# Patient Record
Sex: MALE | Race: WHITE | NOT HISPANIC OR LATINO | Employment: OTHER | URBAN - METROPOLITAN AREA
[De-identification: names, ages, dates, MRNs, and addresses within clinical notes are randomized per-mention and may not be internally consistent; named-entity substitution may affect disease eponyms.]

---

## 2017-01-05 DIAGNOSIS — Z12.5 SCREENING PSA (PROSTATE SPECIFIC ANTIGEN): ICD-10-CM

## 2017-01-05 DIAGNOSIS — I10 ESSENTIAL HYPERTENSION: Primary | ICD-10-CM

## 2017-01-05 DIAGNOSIS — Z87.39 HISTORY OF GOUT: ICD-10-CM

## 2017-01-05 DIAGNOSIS — E78.5 HYPERLIPIDEMIA, UNSPECIFIED HYPERLIPIDEMIA TYPE: ICD-10-CM

## 2017-01-10 LAB
ALBUMIN SERPL-MCNC: 4.7 G/DL (ref 3.5–5.2)
ALBUMIN/GLOB SERPL: 2.1 G/DL
ALP SERPL-CCNC: 85 U/L (ref 39–117)
ALT SERPL-CCNC: 34 U/L (ref 1–41)
AST SERPL-CCNC: 16 U/L (ref 1–40)
BILIRUB SERPL-MCNC: 0.7 MG/DL (ref 0.1–1.2)
BUN SERPL-MCNC: 24 MG/DL (ref 8–23)
BUN/CREAT SERPL: 26.1 (ref 7–25)
CALCIUM SERPL-MCNC: 9.6 MG/DL (ref 8.6–10.5)
CHLORIDE SERPL-SCNC: 103 MMOL/L (ref 98–107)
CHOLEST SERPL-MCNC: 185 MG/DL (ref 0–200)
CO2 SERPL-SCNC: 27.4 MMOL/L (ref 22–29)
CREAT SERPL-MCNC: 0.92 MG/DL (ref 0.76–1.27)
GLOBULIN SER CALC-MCNC: 2.2 GM/DL
GLUCOSE SERPL-MCNC: 114 MG/DL (ref 65–99)
HDLC SERPL-MCNC: 49 MG/DL (ref 40–60)
LDLC SERPL CALC-MCNC: 116 MG/DL (ref 0–100)
LDLC/HDLC SERPL: 2.36 {RATIO}
POTASSIUM SERPL-SCNC: 4.4 MMOL/L (ref 3.5–5.2)
PROT SERPL-MCNC: 6.9 G/DL (ref 6–8.5)
PSA SERPL-MCNC: 1.68 NG/ML (ref 0–4)
SODIUM SERPL-SCNC: 142 MMOL/L (ref 136–145)
TRIGL SERPL-MCNC: 102 MG/DL (ref 0–150)
URATE SERPL-MCNC: 6.8 MG/DL (ref 3.4–7)
VLDLC SERPL CALC-MCNC: 20.4 MG/DL (ref 5–40)

## 2017-01-16 ENCOUNTER — OFFICE VISIT (OUTPATIENT)
Dept: FAMILY MEDICINE CLINIC | Facility: CLINIC | Age: 67
End: 2017-01-16

## 2017-01-16 VITALS
SYSTOLIC BLOOD PRESSURE: 154 MMHG | TEMPERATURE: 97.6 F | BODY MASS INDEX: 31.7 KG/M2 | DIASTOLIC BLOOD PRESSURE: 80 MMHG | HEIGHT: 69 IN | RESPIRATION RATE: 16 BRPM | WEIGHT: 214 LBS

## 2017-01-16 DIAGNOSIS — R41.840 ATTENTION OR CONCENTRATION DEFICIT: ICD-10-CM

## 2017-01-16 DIAGNOSIS — I10 ESSENTIAL HYPERTENSION: ICD-10-CM

## 2017-01-16 DIAGNOSIS — Z23 NEED FOR VACCINATION: Primary | ICD-10-CM

## 2017-01-16 DIAGNOSIS — E78.00 PURE HYPERCHOLESTEROLEMIA: ICD-10-CM

## 2017-01-16 DIAGNOSIS — Z87.39 HISTORY OF GOUT: ICD-10-CM

## 2017-01-16 PROCEDURE — 90670 PCV13 VACCINE IM: CPT

## 2017-01-16 PROCEDURE — 99213 OFFICE O/P EST LOW 20 MIN: CPT

## 2017-01-16 PROCEDURE — G0009 ADMIN PNEUMOCOCCAL VACCINE: HCPCS

## 2017-01-16 RX ORDER — DIAZEPAM 5 MG/1
5 TABLET ORAL 2 TIMES DAILY PRN
Qty: 60 TABLET | Refills: 0 | Status: SHIPPED | OUTPATIENT
Start: 2017-01-16 | End: 2021-05-14

## 2017-01-16 RX ORDER — LOSARTAN POTASSIUM 100 MG/1
100 TABLET ORAL DAILY
Qty: 30 TABLET | Refills: 5 | Status: SHIPPED | OUTPATIENT
Start: 2017-01-16 | End: 2017-01-20

## 2017-01-16 RX ORDER — METHYLPHENIDATE HYDROCHLORIDE 20 MG/1
20 TABLET ORAL 3 TIMES DAILY
Qty: 90 TABLET | Refills: 0 | Status: SHIPPED | OUTPATIENT
Start: 2017-01-16 | End: 2017-08-28 | Stop reason: SDUPTHER

## 2017-01-16 NOTE — MR AVS SNAPSHOT
Carlos Webb   1/16/2017 9:00 AM   Office Visit    Dept Phone:  [phone]   Encounter #:  [id number]    Provider:  Pramod Burger MD   Department:  [facility]                Your Full Care Plan              Today's Medication Changes          These changes are accurate as of: 1/16/17  9:22 AM.  If you have any questions, ask your nurse or doctor.               New Medication(s)Ordered:     losartan 100 MG tablet   Commonly known as:  COZAAR   Take 1 tablet by mouth Daily.   Started by:  Alexa Baires MA         Medication(s)that have changed:     diazePAM 5 MG tablet   Commonly known as:  VALIUM   Take 1 tablet by mouth 2 (Two) Times a Day As Needed for anxiety.   What changed:  reasons to take this   Changed by:  Alexa Baires MA            Where to Get Your Medications      These medications were sent to [facility]/pharmacy #[id number] [place], [place] - [address] [address] AT [facility] [phone] [facility] [phone]   [address], [facility] [address]     Phone:  [phone]     losartan 100 MG tablet         You can get these medications from any pharmacy     Bring a paper prescription for each of these medications     diazePAM 5 MG tablet    methylphenidate 20 MG tablet                  Your Updated Medication List          This list is accurate as of: 1/16/17  9:22 AM.  Always use your most recent med list.                allopurinol 300 MG tablet   Commonly known as:  ZYLOPRIM   TAKE 1 TABLET BY MOUTH EVERY DAY       diazePAM 5 MG tablet   Commonly known as:  VALIUM   Take 1 tablet by mouth 2 (Two) Times a Day As Needed for anxiety.       losartan 100 MG tablet   Commonly known as:  COZAAR   Take 1 tablet by mouth Daily.       methylphenidate 20 MG tablet   Commonly known as:  RITALIN   Take 1 tablet by mouth 3 (Three) Times a Day.       verapamil  MG CR tablet   Commonly known as:  CALAN-SR   TAKE 1  "TABLET BY MOUTH EVERY DAY       VOLTAREN 1 % gel gel   Generic drug:  diclofenac               We Performed the Following     Pneumococcal Conjugate Vaccine 13-Valent All       You Were Diagnosed With        Codes Comments    Need for vaccination    -  Primary ICD-10-CM: Z23  ICD-9-CM: V05.9       Instructions     None    Patient Instructions History      Upcoming Appointments     Visit Type Date Time Department    FOLLOW UP 1/16/2017  9:00 AM [facility]      [employer] Signup     Our records indicate that you have an active [employer] account.    You can view your After Visit Summary by going to G3 and logging in with your [employer] username and password.  If you don't have a [employer] username and password but a parent or guardian has access to your record, the parent or guardian should login with their own [employer] username and password and access your record to view the After Visit Summary.    If you have questions, you can email [email or web address] or call [phone] to talk to our [employer] staff.  Remember, [employer] is NOT to be used for urgent needs.  For medical emergencies, dial 911.               Other Info from Your Visit           Allergies     Azithromycin        Reason for Visit     Hyperlipidemia     Hypertension     Gout           Vital Signs     Blood Pressure Temperature Respirations Height Weight Body Mass Index    154/80 97.6 °F (36.4 °C) (Oral) 16 69\" (175.3 cm) 214 lb (97.1 kg) 31.6 kg/m2    Smoking Status                   Former Smoker           Problems and Diagnoses Noted     Need for vaccination    -  Primary      Immunizations Administered     Name Date    Pneumococcal Conjugate 13-Valent         "

## 2017-01-16 NOTE — PROGRESS NOTES
Subjective   Carlos Webb is a 66 y.o. male. Patient is here today for   Chief Complaint   Patient presents with   • Hyperlipidemia   • Hypertension   • Gout          Vitals:    01/16/17 0846   BP: 154/80   Resp: 16   Temp: 97.6 °F (36.4 °C)     The following portions of the patient's history were reviewed and updated as appropriate: allergies, current medications, past family history, past medical history, past social history, past surgical history and problem list.    Past Medical History   Diagnosis Date   • ADHD (attention deficit hyperactivity disorder)    • Anxiety    • Gout    • Hyperlipidemia    • Hypertension    • Splenomegaly       Allergies   Allergen Reactions   • Azithromycin       Social History     Social History   • Marital status:      Spouse name: N/A   • Number of children: N/A   • Years of education: N/A     Occupational History   • Not on file.     Social History Main Topics   • Smoking status: Former Smoker   • Smokeless tobacco: Not on file   • Alcohol use Yes   • Drug use: Not on file   • Sexual activity: Not on file     Other Topics Concern   • Not on file     Social History Narrative        Current Outpatient Prescriptions:   •  allopurinol (ZYLOPRIM) 300 MG tablet, TAKE 1 TABLET BY MOUTH EVERY DAY, Disp: 90 tablet, Rfl: 1  •  verapamil SR (CALAN-SR) 240 MG CR tablet, TAKE 1 TABLET BY MOUTH EVERY DAY, Disp: 90 tablet, Rfl: 1  •  VOLTAREN 1 % gel gel, , Disp: , Rfl:   •  diazePAM (VALIUM) 5 MG tablet, Take 1 tablet by mouth 2 (Two) Times a Day As Needed for anxiety., Disp: 60 tablet, Rfl: 0  •  methylphenidate (RITALIN) 20 MG tablet, Take 1 tablet by mouth 3 (Three) Times a Day., Disp: 90 tablet, Rfl: 0     Objective     History of Present Illness   The patient is here today for follow-up on essential hypertension, mild hyperlipidemia, history of gout, attention deficit disorder and mild chronic anxiety    Review of Systems   Constitutional:        Mild fatigue   HENT: Negative.     Respiratory: Negative for cough, shortness of breath and wheezing.    Cardiovascular: Negative for chest pain, palpitations and leg swelling.   Gastrointestinal: Negative for abdominal pain, blood in stool, constipation and diarrhea.   Genitourinary: Negative.    Musculoskeletal:        Mild aches and pains only.  No recent flareups of gout   Neurological: Negative.    Hematological: Negative.    Psychiatric/Behavioral:        The patient does have a history of attention deficit disorder and is on Ritalin for this.  Patient states that he is doing very well with this medication.  The patient does have tremendous amount of stress at work and does have a certain amount of anxiety related to this.       Physical Exam   Constitutional: He is oriented to person, place, and time. He appears well-developed and well-nourished.   Overweight   Neck:   Carotid pulses normal   Cardiovascular: Normal rate, regular rhythm and normal heart sounds.    Pulmonary/Chest: Effort normal and breath sounds normal. No respiratory distress. He has no wheezes. He has no rales.   Abdominal: Soft. Bowel sounds are normal.   Musculoskeletal:   Mild osteoarthritic changes in joints   Neurological: He is alert and oriented to person, place, and time.   Skin: Skin is warm and dry.   Psychiatric: He has a normal mood and affect.   Nursing note and vitals reviewed.       ASSESSMENT  #1 essential hypertension       #2 mild hyperglycemia       #3 hyperlipidemia-mild       #4 history of gout and hyperuricemia        #5 attention deficit disorder       #6 mild chronic anxiety    DISCUSSION/SUMMARY   The patient's blood pressure was initially somewhat elevated but upon recheck it was only 120/78.  The patient tells me that there are times after getting home from work his blood pressure is very high, in the 180/100 range .  The patient tells me that he has not been on a very good diet at all and is getting very little exercise over the last 6 months.   He is doing well on his Ritalin for his attention deficit disorder.  The patient tells me that he is under a moderate amount of stress, so this is a problem.  CMP was normal except for elevated fasting blood sugar of 114.  Uric acid level is 6.8 but the patient has had no flareups of gout over the last 6 months.  PSA remains normal at 1.68.  Total cholesterol is 185, triglycerides 102, HDL cholesterol 49, and LDL cholesterol is mildly elevated at 116.  Patient is on no medications for his mild hyperlipidemia.  We discussed a low sugar, low starch and low saturated fat diet.  I am concerned about his blood pressure going up as high as it does on a frequent basis.  The cause of that I am starting the patient on losartan 100 mg tablets one daily in addition to his verapamil.  He will closely monitor his blood pressures and let me know if there are any problems with it being too high or too low.  The patient was given a prescription for his Ritalin for the attention deficit disorder and diazepam which she uses for his chronic stress syndrome.  The patient also received a Prevnar 13 vaccine today and will receive a Pneumovax vaccine approximately one year from now.  The patient has had a Zostavax vaccine in the past.    PLAN  Recheck in 6 months with fasting CMP, lipid panel and uric acid level.  No Follow-up on file.

## 2017-01-20 ENCOUNTER — TELEPHONE (OUTPATIENT)
Dept: FAMILY MEDICINE CLINIC | Facility: CLINIC | Age: 67
End: 2017-01-20

## 2017-01-20 RX ORDER — AMLODIPINE BESYLATE 5 MG/1
5 TABLET ORAL DAILY
Qty: 30 TABLET | Refills: 5 | Status: SHIPPED | OUTPATIENT
Start: 2017-01-20 | End: 2017-08-28

## 2017-01-20 NOTE — TELEPHONE ENCOUNTER
Pt aware    ----- Message from Pramod Burger MD sent at 1/20/2017 11:53 AM EST -----   If the patient feels that the losartan is causing muscle weakness stop it.  Send in prescription for amlodipine 5 mg tablets, one daily #30 with 5 refills.  Continue to monitor home blood pressures and let us know if the blood pressure is not improving by midweek  ----- Message -----     From: Alexa Baires MA     Sent: 1/20/2017  10:39 AM       To: Pramod Burger MD        ----- Message -----     From: Jessa Tabor     Sent: 1/20/2017   9:39 AM       To: Alexa Baires MA    LOSARTIN NOT WORKING CAUSING MUSCLE WEAKNESS ABD ISNT LOWERING BP     PLEASE CALL PT TO ADVISE WHAT NEEDS TO BE DONE     [phone]

## 2017-05-15 RX ORDER — ALLOPURINOL 300 MG/1
TABLET ORAL
Qty: 90 TABLET | Refills: 1 | Status: SHIPPED | OUTPATIENT
Start: 2017-05-15 | End: 2017-11-14 | Stop reason: SDUPTHER

## 2017-05-15 RX ORDER — VERAPAMIL HYDROCHLORIDE 240 MG/1
TABLET, FILM COATED, EXTENDED RELEASE ORAL
Qty: 90 TABLET | Refills: 1 | Status: SHIPPED | OUTPATIENT
Start: 2017-05-15 | End: 2017-11-14 | Stop reason: SDUPTHER

## 2017-07-14 DIAGNOSIS — I10 ESSENTIAL HYPERTENSION: Primary | ICD-10-CM

## 2017-07-14 DIAGNOSIS — M10.9 GOUT, UNSPECIFIED CAUSE, UNSPECIFIED CHRONICITY, UNSPECIFIED SITE: ICD-10-CM

## 2017-07-14 DIAGNOSIS — E78.5 HYPERLIPIDEMIA, UNSPECIFIED HYPERLIPIDEMIA TYPE: ICD-10-CM

## 2017-07-17 LAB
ALBUMIN SERPL-MCNC: 4.7 G/DL (ref 3.5–5.2)
ALBUMIN/GLOB SERPL: 1.8 G/DL
ALP SERPL-CCNC: 96 U/L (ref 39–117)
ALT SERPL-CCNC: 35 U/L (ref 1–41)
AST SERPL-CCNC: 26 U/L (ref 1–40)
BILIRUB SERPL-MCNC: 0.6 MG/DL (ref 0.1–1.2)
BUN SERPL-MCNC: 18 MG/DL (ref 8–23)
BUN/CREAT SERPL: 20.7 (ref 7–25)
CALCIUM SERPL-MCNC: 10 MG/DL (ref 8.6–10.5)
CHLORIDE SERPL-SCNC: 105 MMOL/L (ref 98–107)
CHOLEST SERPL-MCNC: 188 MG/DL (ref 0–200)
CO2 SERPL-SCNC: 24.6 MMOL/L (ref 22–29)
CREAT SERPL-MCNC: 0.87 MG/DL (ref 0.76–1.27)
GLOBULIN SER CALC-MCNC: 2.6 GM/DL
GLUCOSE SERPL-MCNC: 115 MG/DL (ref 65–99)
HDLC SERPL-MCNC: 48 MG/DL (ref 40–60)
LDLC SERPL CALC-MCNC: 125 MG/DL (ref 0–100)
LDLC/HDLC SERPL: 2.6 {RATIO}
POTASSIUM SERPL-SCNC: 3.8 MMOL/L (ref 3.5–5.2)
PROT SERPL-MCNC: 7.3 G/DL (ref 6–8.5)
SODIUM SERPL-SCNC: 144 MMOL/L (ref 136–145)
TRIGL SERPL-MCNC: 76 MG/DL (ref 0–150)
URATE SERPL-MCNC: 6.2 MG/DL (ref 3.4–7)
VLDLC SERPL CALC-MCNC: 15.2 MG/DL (ref 5–40)

## 2017-08-28 ENCOUNTER — OFFICE VISIT (OUTPATIENT)
Dept: FAMILY MEDICINE CLINIC | Facility: CLINIC | Age: 67
End: 2017-08-28

## 2017-08-28 VITALS
HEART RATE: 69 BPM | HEIGHT: 69 IN | OXYGEN SATURATION: 97 % | TEMPERATURE: 97.7 F | WEIGHT: 210 LBS | RESPIRATION RATE: 16 BRPM | BODY MASS INDEX: 31.1 KG/M2 | SYSTOLIC BLOOD PRESSURE: 142 MMHG | DIASTOLIC BLOOD PRESSURE: 80 MMHG

## 2017-08-28 DIAGNOSIS — R73.9 HYPERGLYCEMIA: Primary | ICD-10-CM

## 2017-08-28 DIAGNOSIS — I10 ESSENTIAL HYPERTENSION: ICD-10-CM

## 2017-08-28 DIAGNOSIS — Z87.39 HISTORY OF GOUT: ICD-10-CM

## 2017-08-28 DIAGNOSIS — E78.00 PURE HYPERCHOLESTEROLEMIA: ICD-10-CM

## 2017-08-28 DIAGNOSIS — R41.840 ATTENTION OR CONCENTRATION DEFICIT: ICD-10-CM

## 2017-08-28 PROCEDURE — 99213 OFFICE O/P EST LOW 20 MIN: CPT

## 2017-08-28 RX ORDER — METHYLPHENIDATE HYDROCHLORIDE 20 MG/1
20 TABLET ORAL 3 TIMES DAILY
Qty: 90 TABLET | Refills: 0 | Status: SHIPPED | OUTPATIENT
Start: 2017-08-28 | End: 2017-10-10 | Stop reason: SDUPTHER

## 2017-08-28 NOTE — PROGRESS NOTES
Subjective   Carlos Webb is a 66 y.o. male. Patient is here today for   Chief Complaint   Patient presents with   • Hyperlipidemia   • Hypertension   • Gout          Vitals:    08/28/17 0825   BP: 142/80   Pulse: 69   Resp: 16   Temp: 97.7 °F (36.5 °C)   SpO2: 97%     The following portions of the patient's history were reviewed and updated as appropriate: allergies, current medications, past family history, past medical history, past social history, past surgical history and problem list.    Past Medical History:   Diagnosis Date   • ADHD (attention deficit hyperactivity disorder)    • Anxiety    • Gout    • Hyperlipidemia    • Hypertension    • Splenomegaly       Allergies   Allergen Reactions   • Azithromycin       Social History     Social History   • Marital status:      Spouse name: N/A   • Number of children: N/A   • Years of education: N/A     Occupational History   • Not on file.     Social History Main Topics   • Smoking status: Former Smoker   • Smokeless tobacco: Not on file   • Alcohol use Yes   • Drug use: Not on file   • Sexual activity: Not on file     Other Topics Concern   • Not on file     Social History Narrative        Current Outpatient Prescriptions:   •  allopurinol (ZYLOPRIM) 300 MG tablet, TAKE 1 TABLET BY MOUTH EVERY DAY, Disp: 90 tablet, Rfl: 1  •  diazePAM (VALIUM) 5 MG tablet, Take 1 tablet by mouth 2 (Two) Times a Day As Needed for anxiety., Disp: 60 tablet, Rfl: 0  •  verapamil SR (CALAN-SR) 240 MG CR tablet, TAKE 1 TABLET BY MOUTH EVERY DAY, Disp: 90 tablet, Rfl: 1  •  VOLTAREN 1 % gel gel, , Disp: , Rfl:   •  methylphenidate (RITALIN) 20 MG tablet, Take 1 tablet by mouth 3 (Three) Times a Day., Disp: 90 tablet, Rfl: 0     Objective     History of Present Illness   The patient's follow-up on essential hypertension, hyperlipidemia, history of gout, and attention deficit disorder    Review of Systems   Constitutional:        Mild fatigue   HENT: Negative.    Respiratory:  Negative for cough, shortness of breath and wheezing.    Cardiovascular: Negative for chest pain, palpitations and leg swelling.   Gastrointestinal: Negative for abdominal pain, blood in stool, constipation and diarrhea.   Genitourinary: Negative for difficulty urinating and dysuria.   Musculoskeletal:        Minor aches and pains only   Neurological: Negative.    Hematological: Negative.    Psychiatric/Behavioral:        The patient does have a history of attention deficit disorder and is doing well on his current medications.  The patient does have quite a lot of work stress.       Physical Exam   Constitutional: He is oriented to person, place, and time. He appears well-developed and well-nourished.   Overweight   Neck:   Carotid pulses normal   Cardiovascular: Normal rate, regular rhythm and normal heart sounds.    Pulmonary/Chest: Effort normal and breath sounds normal. No respiratory distress. He has no wheezes. He has no rales.   Abdominal: Soft. Bowel sounds are normal.   Musculoskeletal: Normal range of motion.   Neurological: He is alert and oriented to person, place, and time.   Skin: Skin is warm and dry.   Psychiatric: He has a normal mood and affect.   Nursing note and vitals reviewed.      ASSESSMENT  #1 essential hypertension               #2 mild hyperlipidemia              #3 attention deficit disorder              #4 history of gout               #5 hyperglycemia    DISCUSSION/SUMMARY   The patient's blood pressure was initially slightly elevated at 142/80 but upon recheck it was down to 136 /80.  The patient will continue to follow his blood pressures along closely at home.  The patient states that he is only taking verapamil for his hypertension at this time.  The patient states that his diet and exercise level has been very poor over the last 6 months.  CMP was normal except for elevated fasting blood sugar of 115 .  We discussed a low sugar, low starch and low saturated fat diet as well as him  increasing his aerobic exercise.  Total cholesterol is 188, triglycerides 76, HDL cholesterol 48, and LDL cholesterol is mildly elevated at 125.  The patient's uric acid level was 6.2.  The patient states he is only had 1 mild attack of gout in the last 6 months.  The patient does have tremendous amount of work stress but he intends to work at least another year.  The patient is due for another colonoscopy.  [facility]  for all tests to be done there  Free of charge                                    PLAN  Continue his present medications.  A low-dose starch, sugar and saturated fat diet was emphasized.  Continue Ritalin as is..  Recheck in 6 months for annual physical exam  No Follow-up on file.

## 2017-10-10 ENCOUNTER — TELEPHONE (OUTPATIENT)
Dept: FAMILY MEDICINE CLINIC | Facility: CLINIC | Age: 67
End: 2017-10-10

## 2017-10-10 RX ORDER — METHYLPHENIDATE HYDROCHLORIDE 20 MG/1
20 TABLET ORAL 3 TIMES DAILY
Qty: 90 TABLET | Refills: 0 | Status: SHIPPED | OUTPATIENT
Start: 2017-10-10 | End: 2017-11-28 | Stop reason: SDUPTHER

## 2017-10-10 NOTE — TELEPHONE ENCOUNTER
LEFT MESSAGE LETTING PATIENT KNOW THIS IS READY FOR     ----- Message from Yasemin Dickey sent at 10/9/2017 12:13 PM EDT -----  REFILL ON RITALIN    PLEASE CALL PT WHEN READY [phone]      PT WOULD ALSO LIKE TO SEE A PROCTOLOGIST

## 2017-10-19 ENCOUNTER — TELEPHONE (OUTPATIENT)
Dept: FAMILY MEDICINE CLINIC | Facility: CLINIC | Age: 67
End: 2017-10-19

## 2017-10-19 DIAGNOSIS — Z12.11 ENCOUNTER FOR SCREENING COLONOSCOPY: Primary | ICD-10-CM

## 2017-10-19 NOTE — TELEPHONE ENCOUNTER
Order has been put in [facility] for screening colonoscopy    ----- Message from Yasemin Dickey sent at 10/13/2017  3:39 PM EDT -----  PT CAME IN TODAY WANTING TO SEE A PROCTOLOGIST AND TO HAVE A COLONOSCOPY.    PLEASE CALL PT WITH QUESTIONS [phone]

## 2017-11-15 RX ORDER — ALLOPURINOL 300 MG/1
TABLET ORAL
Qty: 90 TABLET | Refills: 1 | Status: SHIPPED | OUTPATIENT
Start: 2017-11-15 | End: 2018-05-11 | Stop reason: SDUPTHER

## 2017-11-15 RX ORDER — VERAPAMIL HYDROCHLORIDE 240 MG/1
TABLET, FILM COATED, EXTENDED RELEASE ORAL
Qty: 90 TABLET | Refills: 1 | Status: SHIPPED | OUTPATIENT
Start: 2017-11-15 | End: 2018-05-11 | Stop reason: SDUPTHER

## 2017-11-27 ENCOUNTER — TELEPHONE (OUTPATIENT)
Dept: FAMILY MEDICINE CLINIC | Facility: CLINIC | Age: 67
End: 2017-11-27

## 2017-11-27 NOTE — TELEPHONE ENCOUNTER
TRIED CALLING PT LAST WEEK REGARDING THIS. HAD TO LEAVE VOICEMAIL.    ----- Message from Lana Daigle MA sent at 11/22/2017 11:09 AM EST -----  Contact: pt  PT IS WANTING THE RESULTS OF HIS PHYSICAL FROM LAST YEAR HE SAYS HE DOESN'T NEED THE LABS HE HAS THE LABS HE IS WANTING THE RESULTS FROM DR MCKEON FINDINGS SUCH AS HIS BP AND BREATHING PT CAN BE REACHED [phone]

## 2017-11-28 ENCOUNTER — TELEPHONE (OUTPATIENT)
Dept: FAMILY MEDICINE CLINIC | Facility: CLINIC | Age: 67
End: 2017-11-28

## 2017-11-28 RX ORDER — METHYLPHENIDATE HYDROCHLORIDE 20 MG/1
20 TABLET ORAL 3 TIMES DAILY
Qty: 90 TABLET | Refills: 0 | Status: SHIPPED | OUTPATIENT
Start: 2017-11-28 | End: 2018-03-27

## 2017-11-28 NOTE — TELEPHONE ENCOUNTER
LEFT VOICEMAIL LETTING PATIENT KNOW THIS IS READY FOR     ----- Message from Lana Daigle MA sent at 11/27/2017  2:14 PM EST -----  Contact: PT  PT NEEDS RX REFILL FOR methylphenidate (RITALIN) 20 MG tablet QTY 90 PT CAN BE REACHED [phone]

## 2018-01-16 ENCOUNTER — TELEPHONE (OUTPATIENT)
Dept: FAMILY MEDICINE CLINIC | Facility: CLINIC | Age: 68
End: 2018-01-16

## 2018-01-16 NOTE — TELEPHONE ENCOUNTER
PATIENT IS SCHEDULED TO SEE DR. MYLES FOR THIS ON 1/18/18.    ----- Message from Nato Mock MA sent at 1/16/2018  2:16 PM EST -----  Contact: PT   PT CAME IN THE OFFICE TODAY AND STATED HE WANTED TO GET A REFILL ON ACETAMINOPHEN W/CODIENE TABLETS #4 30 DAY SUPPLY, BECAUSE HE FELL ON ICE YESTERDAY AND HURT HIS BACK. I DID LET HIM KNOW HE WOULD NEED TO SCHEDULE AN APPT, BECAUSE IT HAD BEEN AWHILE SINCE DR. KOHLER HAD PRESCRIBED THIS. PLEASE CALL PT @ [phone]. PT COMING IN TOMORROW TO SEE DR. VAZQUEZ IN THE MORNING. THANK YOU

## 2018-01-30 ENCOUNTER — TELEPHONE (OUTPATIENT)
Dept: ORTHOPEDIC SURGERY | Facility: CLINIC | Age: 68
End: 2018-01-30

## 2018-01-30 NOTE — TELEPHONE ENCOUNTER
If it is a new fracture and we have MRI report and/or images to document then I'm happy to see him.  It's for long-term thoracic back pain and an old fracture then not so much

## 2018-02-06 ENCOUNTER — TELEPHONE (OUTPATIENT)
Dept: ORTHOPEDIC SURGERY | Facility: CLINIC | Age: 68
End: 2018-02-06

## 2018-02-06 NOTE — TELEPHONE ENCOUNTER
I will see him but do not overrbook:  the fracture is 6 weeks old.  Make sure he brings a CT scan of the thoracic spine.

## 2018-02-27 ENCOUNTER — OFFICE VISIT (OUTPATIENT)
Dept: ORTHOPEDIC SURGERY | Facility: CLINIC | Age: 68
End: 2018-02-27

## 2018-02-27 DIAGNOSIS — M54.6 THORACIC SPINE PAIN: Primary | ICD-10-CM

## 2018-02-27 DIAGNOSIS — M43.24 ANKYLOSIS OF THORACIC SPINE: ICD-10-CM

## 2018-02-27 PROCEDURE — 72070 X-RAY EXAM THORAC SPINE 2VWS: CPT | Performed by: ORTHOPAEDIC SURGERY

## 2018-02-27 PROCEDURE — 99204 OFFICE O/P NEW MOD 45 MIN: CPT | Performed by: ORTHOPAEDIC SURGERY

## 2018-02-27 NOTE — PROGRESS NOTES
New patient or new problem visit    CC: Thoracic back pain    HPI: He complains of 5 or 6 week history of thoracic back pain since slipping and falling on ice on his back pain is improving he's been wearing a brace.  Only the pain is mild nonradiating improving and new to him as he had no significant back pain before.  No balance difficulties or lower extremity complaints.    PFSH: See attached    ROS: No fever chills or weight loss.  No balance difficulties bowel or bladder complaints.  No dizziness.  10 point review of systems checked and is negative.    PE: Constitutional: Vital signs above-noted.  Awake, alert and oriented    Psychiatric: Affect and insight do not appear grossly disturbed.    Pulmonary: Breathing is unlabored, color is good.    Skin: Warm, dry and normal turgor    Cardiac:  pedal pulses intact.  No edema.    Eyesight and hearing appear adequate for examination purposes      Musculoskeletal:  There is mild tenderness to percussion and palpation of the spine. Motion appears undisturbed.  Posture is unremarkable to coronal and sagittal inspection.    The skin about the area is intact.  There is no palpable or visible deformity.  There is no local spasm.       Neurologic:   .   Motor function is undisturbed in quadriceps, EHL, and gastrocnemius      Sensation appears symmetrically intact to light touch   .  In the bilateral lower extremities there is no evidence of atrophy.   Clonus is absent..  Gait appears undisturbed.  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  Overall alignment is excellent.  [facility]  A CT scan of cervical thoracic and lumbar spine are all reviewed the thoracic spine shows a fracture through the lower body of the T7 vertebra which goes to about mid body.  This appears to be the center of  the fused segment.    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  He is 6 weeks into this with brace treatment.  He appears to be doing reasonably well and his had no neurologic sequela.    Plan: Any bracing for a month I'll see him with repeat x-rays at that time.  He can walk for exercise but I want him to hold off of aggressive therapy right now.  I explained the nature of the mechanical instability in this type of fracture and fortunately he's been treated well and healing nicely but I do want to see him back

## 2018-03-12 DIAGNOSIS — M10.9 GOUT, UNSPECIFIED CAUSE, UNSPECIFIED CHRONICITY, UNSPECIFIED SITE: ICD-10-CM

## 2018-03-12 DIAGNOSIS — Z00.00 ROUTINE HEALTH MAINTENANCE: Primary | ICD-10-CM

## 2018-03-12 DIAGNOSIS — Z11.59 NEED FOR HEPATITIS C SCREENING TEST: ICD-10-CM

## 2018-03-12 DIAGNOSIS — Z12.5 SCREENING PSA (PROSTATE SPECIFIC ANTIGEN): ICD-10-CM

## 2018-03-27 ENCOUNTER — OFFICE VISIT (OUTPATIENT)
Dept: ORTHOPEDIC SURGERY | Facility: CLINIC | Age: 68
End: 2018-03-27

## 2018-03-27 VITALS — HEIGHT: 69 IN | TEMPERATURE: 98.7 F | BODY MASS INDEX: 30.96 KG/M2 | WEIGHT: 209 LBS

## 2018-03-27 DIAGNOSIS — M43.20 ANKYLOSIS OF SPINE: ICD-10-CM

## 2018-03-27 DIAGNOSIS — Z87.81 HISTORY OF FRACTURE: Primary | ICD-10-CM

## 2018-03-27 PROCEDURE — 99213 OFFICE O/P EST LOW 20 MIN: CPT | Performed by: ORTHOPAEDIC SURGERY

## 2018-03-27 PROCEDURE — 72070 X-RAY EXAM THORAC SPINE 2VWS: CPT | Performed by: ORTHOPAEDIC SURGERY

## 2018-03-27 RX ORDER — LISINOPRIL 10 MG/1
TABLET ORAL
Refills: 5 | COMMUNITY
Start: 2018-03-22 | End: 2018-09-28

## 2018-03-27 RX ORDER — HYDROCHLOROTHIAZIDE 25 MG/1
25 TABLET ORAL DAILY
Refills: 5 | COMMUNITY
Start: 2018-02-05 | End: 2018-09-28

## 2018-03-27 NOTE — PROGRESS NOTES
No new complaints.  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  Any event his spine is ankylosed and he has no further complaints.  Good strength on exam today his posture is unremarkable.  Two-view x-rays of thoracic spine show no change in posture and prior presumedly healing compared to prior films.  At this point some physical therapy and postural instruction in when necessary follow-up are all that his left to do.

## 2018-05-11 RX ORDER — ALLOPURINOL 300 MG/1
TABLET ORAL
Qty: 90 TABLET | Refills: 1 | Status: SHIPPED | OUTPATIENT
Start: 2018-05-11 | End: 2018-11-15 | Stop reason: SDUPTHER

## 2018-05-11 RX ORDER — VERAPAMIL HYDROCHLORIDE 240 MG/1
TABLET, FILM COATED, EXTENDED RELEASE ORAL
Qty: 90 TABLET | Refills: 1 | Status: SHIPPED | OUTPATIENT
Start: 2018-05-11 | End: 2018-11-15 | Stop reason: SDUPTHER

## 2018-08-30 ENCOUNTER — OFFICE VISIT (OUTPATIENT)
Dept: FAMILY MEDICINE CLINIC | Facility: CLINIC | Age: 68
End: 2018-08-30

## 2018-08-30 VITALS
HEART RATE: 68 BPM | OXYGEN SATURATION: 98 % | SYSTOLIC BLOOD PRESSURE: 120 MMHG | DIASTOLIC BLOOD PRESSURE: 76 MMHG | HEIGHT: 69 IN | BODY MASS INDEX: 30.21 KG/M2 | RESPIRATION RATE: 18 BRPM | WEIGHT: 204 LBS

## 2018-08-30 DIAGNOSIS — K62.5 BRIGHT RED RECTAL BLEEDING: Primary | ICD-10-CM

## 2018-08-30 DIAGNOSIS — K64.8 OTHER HEMORRHOIDS: ICD-10-CM

## 2018-08-30 DIAGNOSIS — R41.840 ATTENTION OR CONCENTRATION DEFICIT: ICD-10-CM

## 2018-08-30 PROCEDURE — 99213 OFFICE O/P EST LOW 20 MIN: CPT

## 2018-08-30 RX ORDER — METHYLPHENIDATE HYDROCHLORIDE 20 MG/1
20 TABLET ORAL
Qty: 90 TABLET | Refills: 0 | Status: SHIPPED | OUTPATIENT
Start: 2018-08-30 | End: 2018-09-28

## 2018-08-30 NOTE — PROGRESS NOTES
Subjective   Carlos Webb is a 67 y.o. male. Patient is here today for   Chief Complaint   Patient presents with   • Hemorrhoids          Vitals:    08/30/18 0941   BP: 120/76   Pulse: 68   Resp: 18   SpO2: 98%     The following portions of the patient's history were reviewed and updated as appropriate: allergies, current medications, past family history, past medical history, past social history, past surgical history and problem list.    Past Medical History:   Diagnosis Date   • ADHD (attention deficit hyperactivity disorder)    • Anxiety    • Gout    • Hyperlipidemia    • Hypertension    • Splenomegaly       Allergies   Allergen Reactions   • Azithromycin       Social History     Social History   • Marital status:      Spouse name: N/A   • Number of children: N/A   • Years of education: N/A     Occupational History   • Not on file.     Social History Main Topics   • Smoking status: Former Smoker   • Smokeless tobacco: Not on file   • Alcohol use Yes   • Drug use: Unknown   • Sexual activity: Not on file     Other Topics Concern   • Not on file     Social History Narrative   • No narrative on file        Current Outpatient Prescriptions:   •  allopurinol (ZYLOPRIM) 300 MG tablet, TAKE 1 TABLET BY MOUTH EVERY DAY, Disp: 90 tablet, Rfl: 1  •  diazePAM (VALIUM) 5 MG tablet, Take 1 tablet by mouth 2 (Two) Times a Day As Needed for anxiety., Disp: 60 tablet, Rfl: 0  •  hydrochlorothiazide (HYDRODIURIL) 25 MG tablet, Take 25 mg by mouth Daily., Disp: , Rfl: 5  •  lisinopril (PRINIVIL,ZESTRIL) 10 MG tablet, TABLET BY MOUTH EVERY DAY, Disp: , Rfl: 5  •  verapamil SR (CALAN-SR) 240 MG CR tablet, TAKE 1 TABLET BY MOUTH EVERY DAY, Disp: 90 tablet, Rfl: 1  •  methylphenidate (RITALIN) 20 MG tablet, Take 1 tablet by mouth 3 (Three) Times a Day With Meals., Disp: 90 tablet, Rfl: 0     Objective     History of Present Illness   The patient is here today because he has had episodic bright red rectal bleeding and some  rectal discomfort.  The patient also has a history of attention deficit disorder and would like another prescription for his Ritalin.     Review of Systems   Constitutional: Negative for appetite change, chills and fever.        The patient has retired and has much less stress now.   HENT: Negative.    Respiratory: Negative for cough, shortness of breath and wheezing.    Cardiovascular: Negative for chest pain, palpitations and leg swelling.   Gastrointestinal: Negative for abdominal pain, anal bleeding, constipation, diarrhea and nausea.   Genitourinary: Negative.    Musculoskeletal:        Minor aches and pains only   Neurological: Negative.    Psychiatric/Behavioral:        The patient does have a history of attention deficit disorder but now that he is not working he does not feel he needs to take the ADD medication on a regular basis.       Physical Exam   Constitutional: He is oriented to person, place, and time. He appears well-developed and well-nourished.   Moderately overweight   Cardiovascular: Normal rate, regular rhythm and normal heart sounds.    Pulmonary/Chest: Effort normal and breath sounds normal. No respiratory distress. He has no wheezes. He has no rales.   Abdominal: Soft. Bowel sounds are normal. He exhibits no distension and no mass. There is no tenderness. There is no guarding.   Musculoskeletal: Normal range of motion. He exhibits no edema.   Neurological: He is alert and oriented to person, place, and time.   Skin: Skin is warm and dry.   Psychiatric: He has a normal mood and affect.   Nursing note and vitals reviewed.      ASSESSMENT  #1 history of hemorrhoids                 #2 episodic bright red rectal bleeding with a bowel movement                 #3 attention deficit disorder    DISCUSSION/SUMMARY   Signs are normal.  The patient states that he has been having episodic bright red rectal bleeding and some anal discomfort with bowel movements.  Patient denies any abdominal cramping,  nausea, constipation, or loss of appetite.  The patient's last colonoscopy was in June 2006, done by Dr. Batista, and was normal except for external hemorrhoids.  The patient did have a somewhat tortuous colon.  I am going to refer the patient to Dr. Charlotte Gates for evaluation of his hemorrhoid problem as well as the need for colonoscopy.  The patient was given a prescription for his Ritalin 20 mg tablets 1 3 times a day #90.  The patient also goes to the [facility] for treatment of his hypertension.  He states that labs done there several months ago were essentially all normal.    PLAN  Refer to Dr. Charlotte Gates   No Follow-up on file.

## 2018-09-28 ENCOUNTER — OFFICE VISIT (OUTPATIENT)
Dept: SURGERY | Facility: CLINIC | Age: 68
End: 2018-09-28

## 2018-09-28 VITALS
WEIGHT: 207 LBS | HEART RATE: 77 BPM | TEMPERATURE: 97.6 F | DIASTOLIC BLOOD PRESSURE: 96 MMHG | SYSTOLIC BLOOD PRESSURE: 160 MMHG | OXYGEN SATURATION: 97 % | BODY MASS INDEX: 30.66 KG/M2 | HEIGHT: 69 IN

## 2018-09-28 DIAGNOSIS — K62.5 RECTAL BLEEDING: ICD-10-CM

## 2018-09-28 DIAGNOSIS — K64.8 INTERNAL HEMORRHOIDS WITH COMPLICATION: Primary | ICD-10-CM

## 2018-09-28 DIAGNOSIS — K64.4 EXTERNAL HEMORRHOIDS WITH COMPLICATION: ICD-10-CM

## 2018-09-28 PROCEDURE — 46600 DIAGNOSTIC ANOSCOPY SPX: CPT | Performed by: COLON & RECTAL SURGERY

## 2018-09-28 PROCEDURE — 99204 OFFICE O/P NEW MOD 45 MIN: CPT | Performed by: COLON & RECTAL SURGERY

## 2018-09-28 RX ORDER — HYDROCORTISONE ACETATE 25 MG/1
25 SUPPOSITORY RECTAL EVERY 12 HOURS
Qty: 14 SUPPOSITORY | Refills: 1 | Status: SHIPPED | OUTPATIENT
Start: 2018-09-28 | End: 2018-10-05

## 2018-11-07 RX ORDER — ASPIRIN 81 MG/1
81 TABLET, CHEWABLE ORAL DAILY
COMMUNITY
End: 2021-05-14

## 2018-11-07 RX ORDER — CHLORAL HYDRATE 500 MG
1000 CAPSULE ORAL
COMMUNITY
End: 2021-05-14

## 2018-11-08 ENCOUNTER — ANESTHESIA (OUTPATIENT)
Dept: GASTROENTEROLOGY | Facility: HOSPITAL | Age: 68
End: 2018-11-08

## 2018-11-08 ENCOUNTER — HOSPITAL ENCOUNTER (OUTPATIENT)
Facility: HOSPITAL | Age: 68
Setting detail: HOSPITAL OUTPATIENT SURGERY
Discharge: HOME | End: 2018-11-08
Attending: COLON & RECTAL SURGERY | Admitting: COLON & RECTAL SURGERY

## 2018-11-08 ENCOUNTER — ANESTHESIA EVENT (OUTPATIENT)
Dept: GASTROENTEROLOGY | Facility: HOSPITAL | Age: 68
End: 2018-11-08

## 2018-11-08 VITALS
RESPIRATION RATE: 14 BRPM | HEART RATE: 65 BPM | TEMPERATURE: 97.4 F | HEIGHT: 69 IN | SYSTOLIC BLOOD PRESSURE: 147 MMHG | BODY MASS INDEX: 29.47 KG/M2 | OXYGEN SATURATION: 97 % | DIASTOLIC BLOOD PRESSURE: 94 MMHG | WEIGHT: 199 LBS

## 2018-11-08 DIAGNOSIS — K62.5 RECTAL BLEEDING: ICD-10-CM

## 2018-11-08 PROCEDURE — 45385 COLONOSCOPY W/LESION REMOVAL: CPT | Performed by: COLON & RECTAL SURGERY

## 2018-11-08 PROCEDURE — 25010000002 PROPOFOL 10 MG/ML EMULSION: Performed by: ANESTHESIOLOGY

## 2018-11-08 PROCEDURE — 88305 TISSUE EXAM BY PATHOLOGIST: CPT | Performed by: COLON & RECTAL SURGERY

## 2018-11-08 PROCEDURE — 45381 COLONOSCOPY SUBMUCOUS NJX: CPT | Performed by: COLON & RECTAL SURGERY

## 2018-11-08 DEVICE — DEV CLIP ENDO RESOLUTION360 CONTRL ROT 235CM: Type: IMPLANTABLE DEVICE | Status: FUNCTIONAL

## 2018-11-08 RX ORDER — PROMETHAZINE HYDROCHLORIDE 25 MG/1
25 TABLET ORAL ONCE AS NEEDED
Status: DISCONTINUED | OUTPATIENT
Start: 2018-11-08 | End: 2018-11-08 | Stop reason: HOSPADM

## 2018-11-08 RX ORDER — PROMETHAZINE HYDROCHLORIDE 25 MG/1
25 SUPPOSITORY RECTAL ONCE AS NEEDED
Status: DISCONTINUED | OUTPATIENT
Start: 2018-11-08 | End: 2018-11-08 | Stop reason: HOSPADM

## 2018-11-08 RX ORDER — LIDOCAINE HYDROCHLORIDE 20 MG/ML
INJECTION, SOLUTION INFILTRATION; PERINEURAL AS NEEDED
Status: DISCONTINUED | OUTPATIENT
Start: 2018-11-08 | End: 2018-11-08 | Stop reason: SURG

## 2018-11-08 RX ORDER — PROMETHAZINE HYDROCHLORIDE 25 MG/ML
12.5 INJECTION, SOLUTION INTRAMUSCULAR; INTRAVENOUS ONCE AS NEEDED
Status: DISCONTINUED | OUTPATIENT
Start: 2018-11-08 | End: 2018-11-08 | Stop reason: HOSPADM

## 2018-11-08 RX ORDER — PROPOFOL 10 MG/ML
VIAL (ML) INTRAVENOUS CONTINUOUS PRN
Status: DISCONTINUED | OUTPATIENT
Start: 2018-11-08 | End: 2018-11-08 | Stop reason: SURG

## 2018-11-08 RX ORDER — PROPOFOL 10 MG/ML
VIAL (ML) INTRAVENOUS AS NEEDED
Status: DISCONTINUED | OUTPATIENT
Start: 2018-11-08 | End: 2018-11-08 | Stop reason: SURG

## 2018-11-08 RX ORDER — HYDROCODONE BITARTRATE AND ACETAMINOPHEN 5; 325 MG/1; MG/1
TABLET ORAL
Qty: 12 TABLET | Refills: 0 | Status: SHIPPED | OUTPATIENT
Start: 2018-11-08 | End: 2021-05-14

## 2018-11-08 RX ORDER — BUPIVACAINE HYDROCHLORIDE AND EPINEPHRINE 5; 5 MG/ML; UG/ML
INJECTION, SOLUTION PERINEURAL AS NEEDED
Status: DISCONTINUED | OUTPATIENT
Start: 2018-11-08 | End: 2018-11-08 | Stop reason: HOSPADM

## 2018-11-08 RX ORDER — SODIUM CHLORIDE, SODIUM LACTATE, POTASSIUM CHLORIDE, CALCIUM CHLORIDE 600; 310; 30; 20 MG/100ML; MG/100ML; MG/100ML; MG/100ML
1000 INJECTION, SOLUTION INTRAVENOUS CONTINUOUS
Status: DISCONTINUED | OUTPATIENT
Start: 2018-11-08 | End: 2018-11-08 | Stop reason: HOSPADM

## 2018-11-08 RX ADMIN — PROPOFOL 120 MG: 10 INJECTION, EMULSION INTRAVENOUS at 14:49

## 2018-11-08 RX ADMIN — PROPOFOL 160 MCG/KG/MIN: 10 INJECTION, EMULSION INTRAVENOUS at 14:51

## 2018-11-08 RX ADMIN — SODIUM CHLORIDE, POTASSIUM CHLORIDE, SODIUM LACTATE AND CALCIUM CHLORIDE 1000 ML: 600; 310; 30; 20 INJECTION, SOLUTION INTRAVENOUS at 13:55

## 2018-11-08 RX ADMIN — LIDOCAINE HYDROCHLORIDE 80 MG: 20 INJECTION, SOLUTION INFILTRATION; PERINEURAL at 14:48

## 2018-11-08 NOTE — DISCHARGE INSTRUCTIONS
Colonoscopy, Adult, Care After    Dr. Gates   [phone]      This sheet gives you information about how to care for yourself after your procedure. Your doctor may also give you more specific instructions. If you have problems or questions, call your doctor.  Follow these instructions at home:  General instructions    · For the first 24 hours after the procedure:  ? Do not drive or use machinery.  ? Do not sign important documents.  ? Do not drink alcohol.  ? Do your daily activities more slowly than normal.  ? Eat foods that are soft and easy to digest.  ? Rest often.  · Take over-the-counter or prescription medicines only as told by your doctor.  · It is up to you to get the results of your procedure. Ask your doctor, or the department performing the procedure, when your results will be ready.  To help cramping and bloating:  · Try walking around.  · Put heat on your belly (abdomen) as told by your doctor. Use a heat source that your doctor recommends, such as a moist heat pack or a heating pad.  ? Put a towel between your skin and the heat source.  ? Leave the heat on for 20-30 minutes.  ? Remove the heat if your skin turns bright red. This is especially important if you cannot feel pain, heat, or cold. You can get burned.  Eating and drinking  · Drink enough fluid to keep your pee (urine) clear or pale yellow.  · Return to your normal diet as told by your doctor. Avoid heavy or fried foods that are hard to digest.  · Avoid drinking alcohol for as long as told by your doctor.  Contact a doctor if:  · You have blood in your poop (stool) 2-3 days after the procedure.  Get help right away if:  · You have more than a small amount of blood in your poop.  · You see large clumps of tissue (blood clots) in your poop.  · Your belly is swollen.  · You feel sick to your stomach (nauseous).  · You throw up (vomit).  · You have a fever.  · You have belly pain that gets worse, and medicine does not help your pain.  This  information is not intended to replace advice given to you by your health care provider. Make sure you discuss any questions you have with your health care provider.  Document Released: 01/20/2012 Document Revised: 09/11/2017 Document Reviewed: 09/11/2017  [employer] Interactive Patient Education © 2017 [employer]  Colon Polyps  Polyps are tissue growths inside the body. Polyps can grow in many places, including the large intestine (colon). A polyp may be a round bump or a mushroom-shaped growth. You could have one polyp or several.  Most colon polyps are noncancerous (benign). However, some colon polyps can become cancerous over time.  What are the causes?  The exact cause of colon polyps is not known.  What increases the risk?  This condition is more likely to develop in people who:  · Have a family history of colon cancer or colon polyps.  · Are older than 50 or older than 45 if they are .  · Have inflammatory bowel disease, such as ulcerative colitis or Crohn disease.  · Are overweight.  · Smoke cigarettes.  · Do not get enough exercise.  · Drink too much alcohol.  · Eat a diet that is:  ? High in fat and red meat.  ? Low in fiber.  · Had childhood cancer that was treated with abdominal radiation.    What are the signs or symptoms?  Most polyps do not cause symptoms. If you have symptoms, they may include:  · Blood coming from your rectum when having a bowel movement.  · Blood in your stool. The stool may look dark red or black.  · A change in bowel habits, such as constipation or diarrhea.    How is this diagnosed?  This condition is diagnosed with a colonoscopy. This is a procedure that uses a lighted, flexible scope to look at the inside of your colon.  How is this treated?  Treatment for this condition involves removing any polyps that are found. Those polyps will then be tested for cancer. If cancer is found, your health care provider will talk to you about options for colon cancer  treatment.  Follow these instructions at home:  Diet  · Eat plenty of fiber, such as fruits, vegetables, and whole grains.  · Eat foods that are high in calcium and vitamin D, such as milk, cheese, yogurt, eggs, liver, fish, and broccoli.  · Limit foods high in fat, red meats, and processed meats, such as hot dogs, sausage, jones, and lunch meats.  · Maintain a healthy weight, or lose weight if recommended by your health care provider.  General instructions  · Do not smoke cigarettes.  · Do not drink alcohol excessively.  · Keep all follow-up visits as told by your health care provider. This is important. This includes keeping regularly scheduled colonoscopies. Talk to your health care provider about when you need a colonoscopy.  · Exercise every day or as told by your health care provider.  Contact a health care provider if:  · You have new or worsening bleeding during a bowel movement.  · You have new or increased blood in your stool.  · You have a change in bowel habits.  · You unexpectedly lose weight.  This information is not intended to replace advice given to you by your health care provider. Make sure you discuss any questions you have with your health care provider.  Document Released: 09/13/2005 Document Revised: 05/25/2017 Document Reviewed: 11/07/2016  [employer] Interactive Patient Education © 2018 [employer]

## 2018-11-08 NOTE — ANESTHESIA POSTPROCEDURE EVALUATION
"Patient: Carlos Webb Sr.    Procedure Summary     Date:  11/08/18 Room / Location:  [facility] ENDOSCOPY 9 /  [facility]    Anesthesia Start:  1446 Anesthesia Stop:  1526    Procedures:       HEMORRHOID BANDING x 3 with marcainre injection (N/A Rectum)      COLONOSCOPY into cecum with hot snare polypectomies with tattoo marking and clip placement x 3 (N/A ) Diagnosis:       Rectal bleeding      (Rectal bleeding [K62.5])    Surgeon:  Demetrius Gates MD Provider:  Arley Sanches MD    Anesthesia Type:  general ASA Status:  2          Anesthesia Type: general  Last vitals  BP   147/94 (11/08/18 1550)   Temp   36.3 °C (97.4 °F) (11/08/18 1352)   Pulse   65 (11/08/18 1550)   Resp   14 (11/08/18 1550)     SpO2   97 % (11/08/18 1550)     Post Anesthesia Care and Evaluation    Patient location during evaluation: bedside  Patient participation: complete - patient participated  Level of consciousness: awake and alert  Pain management: adequate  Airway patency: patent  Anesthetic complications: No anesthetic complications  PONV Status: none  Cardiovascular status: acceptable  Respiratory status: acceptable  Hydration status: acceptable    Comments: /94 (BP Location: Left arm, Patient Position: Sitting)   Pulse 65   Temp 36.3 °C (97.4 °F) (Oral)   Resp 14   Ht 175.3 cm (69\")   Wt 90.3 kg (199 lb)   SpO2 97%   BMI 29.39 kg/m²         "

## 2018-11-08 NOTE — H&P
Pt c/o hemorrhoids  He noted bleeding x6 months when he wiped: sometimes in commode, usually on toilet paper  He had lots of anorectal pain a few weeks ago, but has calmed down     He did note extra tissue at anus     He has used PrepH, which seemed to help     BMs regular, daily     He takes 1T metamucil qam     No unintentional weight loss     Most recent colonoscopy 2006 with Dr. Batista: no polyps.  Tortuous colon        Medical History        Past Medical History:   Diagnosis Date   • ADHD (attention deficit hyperactivity disorder)     • Ankylosis of thoracic spine (CMS/HCC)     • Anorexia     • Anxiety     • Cataract     • Gallop rhythm     • Gout     • Hemorrhoids     • Hyperglycemia     • Hyperlipidemia     • Hypertension     • Infectious mononucleosis 1968   • Kidney stones 05/31/2002     SEEN AT [facility] ER   • Mesenteric lymphadenopathy     • Multiple lipomas     • Pulmonary nodules 10/2014     RIGHT UPPER LOBE, STABLE   • Rectal bleeding     • Splenomegaly 2015   • T7 vertebral fracture (CMS/HCC) 01/2018     SLIPPED ON ICE            Surgical History         Past Surgical History:   Procedure Laterality Date   • COLONOSCOPY N/A 08/19/1996     WNL, BX OF 40 CM DOLORES, DR. APURVA ORLANDO AT [place]   • COLONOSCOPY N/A 06/19/2006     TORTUOUS COLON, HEMORRHOIDS, IBS, RESCOPE IN 10 YRS, DR. BETSY BATISTA AT [facility]   • EYE SURGERY Left 04/13/2010     CATARACT EXTRACTION WITH IOL, DR. NIXON EDGE AT [place]   • HERNIA REPAIR Bilateral 10/14/1997     INGUINAL, DR. YUNIEL BUNDY AT [facility]   • KNEE MENISCAL REPAIR Right 03/20/2013     PARTIAL POSTERIOR HORN MEDIAL MENISCECTOMY, DR. RHONDA CELESTIN AT [facility]   • LIPOMA EXCISION Left 06/04/2002     EXCISION OF SUBFASCIAL LIPOMA OF THE LEFT SUBMANDIBULAR LIPOMA, DR. DULCE GIBBONS AT [facility]   • MASS EXCISION N/A 07/23/2012     EXCISION OF SUBPLATYSMAL MASS OF NECK, PATH: BENIGN, CONSISTANT WITH LIPOMA, DR. DANIEL REDDY AT [facility]            Social History:   reports  that he has quit smoking. His smoking use included Cigarettes. He has a 10.00 pack-year smoking history. He has never used smokeless tobacco. He reports that he drinks alcohol. He reports that he does not use drugs.        Marriage status:            Family History   Problem Relation Age of Onset   • Alzheimer's disease Mother     • Osteoporosis Mother     • Heart disease Father     • Diabetes Father     • Cancer Sister     • No Known Problems Brother     • No Known Problems Maternal Grandmother     • No Known Problems Maternal Grandfather     • No Known Problems Paternal Grandmother     • No Known Problems Paternal Grandfather     • No Known Problems Sister     • No Known Problems Sister     • No Known Problems Sister     • No Known Problems Daughter     • Crohn's disease Son     • Lung cancer Maternal Uncle     • No Known Problems Son              Current Outpatient Prescriptions:   •  allopurinol (ZYLOPRIM) 300 MG tablet, TAKE 1 TABLET BY MOUTH EVERY DAY, Disp: 90 tablet, Rfl: 1  •  diazePAM (VALIUM) 5 MG tablet, Take 1 tablet by mouth 2 (Two) Times a Day As Needed for anxiety., Disp: 60 tablet, Rfl: 0  •  verapamil SR (CALAN-SR) 240 MG CR tablet, TAKE 1 TABLET BY MOUTH EVERY DAY, Disp: 90 tablet, Rfl: 1  •  hydrocortisone (ANUSOL-HC) 25 MG suppository, Insert 1 suppository into the rectum Every 12 (Twelve) Hours for 7 days., Disp: 14 suppository, Rfl: 1     Allergy  Azithromycin     Review of Systems   Constitution: Negative for decreased appetite, weakness and weight gain.   HENT: Negative for congestion, hearing loss and hoarse voice.    Eyes: Negative for blurred vision, discharge and visual disturbance.   Cardiovascular: Negative for chest pain, cyanosis and leg swelling.   Respiratory: Negative for cough, shortness of breath, sleep disturbances due to breathing and snoring.    Endocrine: Negative for cold intolerance and heat intolerance.   Hematologic/Lymphatic: Does not bruise/bleed easily.   Skin:  "Negative for itching, poor wound healing and skin cancer.   Musculoskeletal: Negative for arthritis, back pain, joint pain and joint swelling.   Gastrointestinal: Negative for abdominal pain, change in bowel habit, bowel incontinence and constipation.   Genitourinary: Negative for bladder incontinence, dysuria and hematuria.   Neurological: Negative for brief paralysis, excessive daytime sleepiness, dizziness, focal weakness, headaches and light-headedness.   Psychiatric/Behavioral: Negative for altered mental status and hallucinations. The patient does not have insomnia.    Allergic/Immunologic: Negative for HIV exposure and persistent infections.   All other systems reviewed and are negative.        BP (!) 168/109 (BP Location: Left arm, Patient Position: Lying)   Pulse 67   Temp 97.4 °F (36.3 °C) (Oral)   Resp 16   Ht 175.3 cm (69\")   Wt 90.3 kg (199 lb)   SpO2 94%   BMI 29.39 kg/m²     Physical Exam   Constitutional: He is oriented to person, place, and time. He appears well-developed and well-nourished. No distress.   HENT:   Head: Normocephalic and atraumatic.   Nose: Nose normal.   Mouth/Throat: Oropharynx is clear and moist.   Eyes: Pupils are equal, round, and reactive to light. Conjunctivae and EOM are normal.   Neck: Normal range of motion. No tracheal deviation present.   Pulmonary/Chest: Effort normal and breath sounds normal. No respiratory distress.   Abdominal: Soft. Bowel sounds are normal. He exhibits no distension.   Genitourinary:   Genitourinary Comments: Perianal exam: external hem - slightly enlarged  DARIA- good tone, no masses  Anoscopy performed:  Grade 2 x 2 internal hem: LLat & RA.  Grade 1 RP   Musculoskeletal: Normal range of motion. He exhibits no edema or deformity.   Neurological: He is alert and oriented to person, place, and time. No cranial nerve deficit. Coordination and gait normal.   Skin: Skin is warm and dry.   Psychiatric: He has a normal mood and affect. His behavior " is normal. Judgment normal.         Review of Medical Record:  I reviewed Dr. Burger records: pt c/o anorectal pain and bleeding     2006 [facility] records Dr. Batista: no polyps     Assessment:  1. Internal hemorrhoids with complication    2. External hemorrhoids with complication    3. Rectal bleeding          Plan:     For the rectal bleeding, I recommend colonoscopy to rule out major sources of bleeding other than hemorrhoids. At the time of colonoscopy if there are no serious issues found at that time, I recommend doing rubber band ligation of the enlarged internal hemorrhoids.  I described risk, benefits and alternatives to the patient.  I described to patient typical post procedure recovery, typical outcomes, and 85% success rate. The patient wishes to proceed.     In the meantime, continue daily fiber supplement.  Rx anusol supp and discussed instructions for use.

## 2018-11-08 NOTE — ANESTHESIA PREPROCEDURE EVALUATION
Anesthesia Evaluation     Patient summary reviewed   NPO Solid Status: > 8 hours  NPO Liquid Status: > 2 hours           Airway   Mallampati: II  TM distance: >3 FB  Neck ROM: full  Possible difficult intubation  Dental      Pulmonary    (-) rhonchi, decreased breath sounds, wheezes  Cardiovascular     Rhythm: regular  Rate: normal    (+) hypertension, hyperlipidemia,   (-) murmur      Neuro/Psych  (+) psychiatric history ADHD,     GI/Hepatic/Renal/Endo    (+)  GI bleeding,     Musculoskeletal     Abdominal     Abdomen: soft.   Substance History      OB/GYN          Other                        Anesthesia Plan    ASA 2     general     intravenous induction   Anesthetic plan, all risks, benefits, and alternatives have been provided, discussed and informed consent has been obtained with: patient.

## 2018-11-10 LAB
CYTO UR: NORMAL
LAB AP CASE REPORT: NORMAL
PATH REPORT.FINAL DX SPEC: NORMAL
PATH REPORT.GROSS SPEC: NORMAL

## 2018-11-15 RX ORDER — ALLOPURINOL 300 MG/1
TABLET ORAL
Qty: 90 TABLET | Refills: 1 | Status: SHIPPED | OUTPATIENT
Start: 2018-11-15 | End: 2019-05-12 | Stop reason: SDUPTHER

## 2018-11-15 RX ORDER — VERAPAMIL HYDROCHLORIDE 240 MG/1
TABLET, FILM COATED, EXTENDED RELEASE ORAL
Qty: 90 TABLET | Refills: 1 | Status: SHIPPED | OUTPATIENT
Start: 2018-11-15 | End: 2019-05-12 | Stop reason: SDUPTHER

## 2019-05-13 RX ORDER — VERAPAMIL HYDROCHLORIDE 240 MG/1
TABLET, FILM COATED, EXTENDED RELEASE ORAL
Qty: 90 TABLET | Refills: 0 | Status: SHIPPED | OUTPATIENT
Start: 2019-05-13 | End: 2021-05-14

## 2019-05-13 RX ORDER — ALLOPURINOL 300 MG/1
TABLET ORAL
Qty: 90 TABLET | Refills: 0 | Status: SHIPPED | OUTPATIENT
Start: 2019-05-13

## 2021-03-09 DIAGNOSIS — Z23 IMMUNIZATION DUE: ICD-10-CM

## 2021-05-14 ENCOUNTER — OFFICE VISIT (OUTPATIENT)
Dept: SURGERY | Facility: CLINIC | Age: 71
End: 2021-05-14

## 2021-05-14 VITALS
HEART RATE: 83 BPM | SYSTOLIC BLOOD PRESSURE: 112 MMHG | DIASTOLIC BLOOD PRESSURE: 64 MMHG | BODY MASS INDEX: 29.13 KG/M2 | OXYGEN SATURATION: 98 % | TEMPERATURE: 97.2 F | WEIGHT: 196.7 LBS | HEIGHT: 69 IN

## 2021-05-14 DIAGNOSIS — K64.8 INTERNAL HEMORRHOIDS WITH COMPLICATION: Primary | ICD-10-CM

## 2021-05-14 PROBLEM — I10 HYPERTENSION: Status: ACTIVE | Noted: 2021-05-14

## 2021-05-14 PROBLEM — K21.00 GASTROESOPHAGEAL REFLUX DISEASE WITH ESOPHAGITIS: Status: ACTIVE | Noted: 2021-05-14

## 2021-05-14 PROCEDURE — 46600 DIAGNOSTIC ANOSCOPY SPX: CPT | Performed by: COLON & RECTAL SURGERY

## 2021-05-14 PROCEDURE — 99213 OFFICE O/P EST LOW 20 MIN: CPT | Performed by: COLON & RECTAL SURGERY

## 2021-05-14 RX ORDER — HYDROCORTISONE ACETATE 25 MG/1
25 SUPPOSITORY RECTAL EVERY 12 HOURS
Qty: 14 SUPPOSITORY | Refills: 0 | Status: SHIPPED | OUTPATIENT
Start: 2021-05-14 | End: 2021-05-21

## 2021-05-14 RX ORDER — AMLODIPINE BESYLATE 10 MG/1
TABLET ORAL
COMMUNITY
Start: 2021-04-23

## 2021-05-14 RX ORDER — TRAZODONE HYDROCHLORIDE 100 MG/1
TABLET ORAL
COMMUNITY
Start: 2021-03-21

## 2021-05-14 RX ORDER — METHYLPREDNISOLONE 4 MG/1
TABLET ORAL
COMMUNITY
Start: 2021-03-21 | End: 2021-05-14

## 2021-09-08 ENCOUNTER — DOCUMENTATION (OUTPATIENT)
Dept: SURGERY | Facility: CLINIC | Age: 71
End: 2021-09-08

## 2021-09-08 NOTE — PROGRESS NOTES
3 YR CY, last done 11/08/2018.     09/09/2021, colonoscopy recall letter mailed to patient.     Thank you.

## 2022-09-02 ENCOUNTER — TRANSCRIBE ORDERS (OUTPATIENT)
Dept: ADMINISTRATIVE | Facility: HOSPITAL | Age: 72
End: 2022-09-02

## 2022-09-02 DIAGNOSIS — C83.30 MALIGNANT LYMPHOMA, CENTROBLASTIC TYPE: Primary | ICD-10-CM

## 2022-09-06 ENCOUNTER — APPOINTMENT (OUTPATIENT)
Dept: PET IMAGING | Facility: HOSPITAL | Age: 72
End: 2022-09-06

## 2022-09-06 ENCOUNTER — HOSPITAL ENCOUNTER (OUTPATIENT)
Dept: PET IMAGING | Facility: HOSPITAL | Age: 72
End: 2022-09-06

## (undated) DEVICE — THE CARR-LOCKE INJECTION NEEDLE IS A SINGLE USE, DISPOSABLE, FLEXIBLE SHEATH INJECTION NEEDLE USED FOR THE INJECTION OF VARIOUS TYPES OF MEDIA THROUGH FLEXIBLE ENDOSCOPES.

## (undated) DEVICE — THE TORRENT IRRIGATION SCOPE CONNECTOR IS USED WITH THE TORRENT IRRIGATION TUBING TO PROVIDE IRRIGATION FLUIDS SUCH AS STERILE WATER DURING GASTROINTESTINAL ENDOSCOPIC PROCEDURES WHEN USED IN CONJUNCTION WITH AN IRRIGATION PUMP (OR ELECTROSURGICAL UNIT).: Brand: TORRENT

## (undated) DEVICE — LIGATOR MULTIBAND SUPERVIEW 7 BX4

## (undated) DEVICE — CANN NASL CO2 TRULINK W/O2 A/

## (undated) DEVICE — SNAR POLYP SENSATION STDOVL 27 240 BX40

## (undated) DEVICE — TUBING, SUCTION, 1/4" X 10', STRAIGHT: Brand: MEDLINE

## (undated) DEVICE — Device: Brand: DEFENDO AIR/WATER/SUCTION AND BIOPSY VALVE

## (undated) DEVICE — Device: Brand: SPOT EX ENDOSCOPIC TATTOO

## (undated) DEVICE — THE SINGLE USE ETRAP – POLYP TRAP IS USED FOR SUCTION RETRIEVAL OF ENDOSCOPICALLY REMOVED POLYPS.: Brand: ETRAP